# Patient Record
Sex: FEMALE | NOT HISPANIC OR LATINO | URBAN - METROPOLITAN AREA
[De-identification: names, ages, dates, MRNs, and addresses within clinical notes are randomized per-mention and may not be internally consistent; named-entity substitution may affect disease eponyms.]

---

## 2019-07-23 ENCOUNTER — OFFICE VISIT (OUTPATIENT)
Dept: URBAN - METROPOLITAN AREA CLINIC 88 | Facility: CLINIC | Age: 39
End: 2019-07-23
Payer: COMMERCIAL

## 2019-07-23 PROCEDURE — 99214 OFFICE O/P EST MOD 30 MIN: CPT | Performed by: OPHTHALMOLOGY

## 2019-07-23 ASSESSMENT — INTRAOCULAR PRESSURE
OS: 13
OD: 13

## 2019-07-23 ASSESSMENT — VISUAL ACUITY
OS: 20/40
OD: 20/30

## 2019-07-23 NOTE — IMPRESSION/PLAN
Impression: Headache: R51. Plan: Discussed diagnosis in detail with patient. Patient has been experience a frontal headaches associated with nausea. Patient denies numbness, no problems with speech or gait. Patient has lost 210 lbs over 2 1/2 years. Diplopia intermittent.  I recommend patient return in 1-2 days for Visual field testing

## 2019-07-23 NOTE — IMPRESSION/PLAN
Impression: Diplopia: H53.2. Plan: Discussed diagnosis in detail with patient. Intermittent Diplopia. Hx of Strabismus. Continue wearing eye patch and she may alternate between eyes.

## 2019-07-24 ENCOUNTER — OFFICE VISIT (OUTPATIENT)
Dept: URBAN - METROPOLITAN AREA CLINIC 88 | Facility: CLINIC | Age: 39
End: 2019-07-24
Payer: COMMERCIAL

## 2019-07-24 DIAGNOSIS — H53.2 DIPLOPIA: ICD-10-CM

## 2019-07-24 DIAGNOSIS — F31.9 BIPOLAR DISORDER, UNSPECIFIED: ICD-10-CM

## 2019-07-24 PROCEDURE — 92083 EXTENDED VISUAL FIELD XM: CPT | Performed by: OPHTHALMOLOGY

## 2019-07-24 PROCEDURE — 99213 OFFICE O/P EST LOW 20 MIN: CPT | Performed by: OPHTHALMOLOGY

## 2019-07-24 ASSESSMENT — INTRAOCULAR PRESSURE
OD: 17
OS: 16

## 2019-07-24 NOTE — IMPRESSION/PLAN
Impression: Headache: R51. Plan: Discussed diagnosis in detail with patient. I would recommend the following lab a Sed Rate, CBC, Chemistry Profile, C reactive protein level, and RPR.  Patient may need Neuro Imaging Studies if her headaches and Diplopia persist. We must also consider Myasthenia Gravis or other Neuro muscular disorders associated with Diplopia

## 2019-10-15 ENCOUNTER — OFFICE VISIT (OUTPATIENT)
Dept: URBAN - METROPOLITAN AREA CLINIC 88 | Facility: CLINIC | Age: 39
End: 2019-10-15
Payer: COMMERCIAL

## 2019-10-15 DIAGNOSIS — H50.9 UNSPECIFIED STRABISMUS: ICD-10-CM

## 2019-10-15 DIAGNOSIS — R51 HEADACHE: Primary | ICD-10-CM

## 2019-10-15 PROCEDURE — 99212 OFFICE O/P EST SF 10 MIN: CPT | Performed by: OPHTHALMOLOGY

## 2019-10-15 ASSESSMENT — INTRAOCULAR PRESSURE
OS: 18
OD: 16

## 2019-10-15 ASSESSMENT — KERATOMETRY
OD: 43.88
OS: 43.88

## 2019-10-15 NOTE — IMPRESSION/PLAN
Impression: Headache: R51. Plan: Hx of Headaches. Reviewed MRI results with patient 10/01/19. No abnormalities seen on MRI.

## 2019-10-15 NOTE — IMPRESSION/PLAN
Impression: Unspecified strabismus: H50.9. Plan: Hx of Strabismus.  Patient would like to be referred to a Eye Muscle Specialist. Will call patient once we obtain the name of the Specialist.